# Patient Record
Sex: MALE | Race: AMERICAN INDIAN OR ALASKA NATIVE
[De-identification: names, ages, dates, MRNs, and addresses within clinical notes are randomized per-mention and may not be internally consistent; named-entity substitution may affect disease eponyms.]

---

## 2018-05-14 ENCOUNTER — HOSPITAL ENCOUNTER (EMERGENCY)
Dept: HOSPITAL 5 - ED | Age: 48
Discharge: HOME | End: 2018-05-14
Payer: SELF-PAY

## 2018-05-14 VITALS — SYSTOLIC BLOOD PRESSURE: 128 MMHG | DIASTOLIC BLOOD PRESSURE: 85 MMHG

## 2018-05-14 DIAGNOSIS — S99.921A: Primary | ICD-10-CM
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
## 2018-05-14 DIAGNOSIS — F17.200: ICD-10-CM
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
## 2018-05-14 DIAGNOSIS — J45.909: ICD-10-CM

## 2018-05-14 PROCEDURE — 99283 EMERGENCY DEPT VISIT LOW MDM: CPT

## 2018-05-14 NOTE — EMERGENCY DEPARTMENT REPORT
ED Lower Extremity HPI





- General


Chief Complaint: Extremity Injury, Lower


Stated Complaint: LEFT FOOT PAIN


Time Seen by Provider: 05/14/18 07:49


Source: patient, family


Mode of arrival: Ambulatory


Limitations: No Limitations, Physical Limitation





- History of Present Illness


Initial Comments: 





Patient reports that he heard a pop.  Found after [place] is right foot and 

heel.  He said he was jumping in and jumped up and landed on his right foot.  

Reports pain 9 out of 10 with weightbearing, walking and an 6 at [facility] 

[place].  Denies any numbness or tingling.  Reports some swelling to the lateral 

foot.  No medication taken per patient.


MD Complaint: foot injury (right foot)


-: Last night


Injury: Foot: Right (pain and swelling)


Type of Injury: inversion


Place: street/outdoors


Severity: moderate


Severity scale (0 -10): 6


Improves With: nothing


Worsens With: weight bearing, movement, palpation


Context: jumping


Associated Symptoms: snap/pop sensation, swelling, ambulatory.  denies: numbness

, tingling


Treatments Prior to Arrival: other (none)





- Related Data


 Previous Rx's











 Medication  Instructions  Recorded  Last Taken  Type


 


Ibuprofen [Motrin] 800 mg PO Q8HR PRN #12 tablet 05/14/18 Unknown Rx











 Allergies











Allergy/AdvReac Type Severity Reaction Status Date / Time


 


No Known Allergies Allergy   Unverified 05/14/18 07:44














ED Review of Systems


ROS: 


Stated complaint: LEFT FOOT PAIN


Other details as noted in HPI





Constitutional: denies: chills, fever


Respiratory: denies: cough, shortness of breath, wheezing


Cardiovascular: denies: chest pain, palpitations, edema, syncope


Gastrointestinal: denies: abdominal pain, nausea, vomiting, diarrhea, 

hematemesis, hematochezia


Musculoskeletal: joint swelling, arthralgia.  denies: back pain, myalgia


Skin: denies: rash, lesions


Neurological: denies: headache, weakness, numbness, paresthesias, confusion, 

abnormal gait, vertigo





ED Past Medical Hx





- Past Medical History


Previous Medical History?: Yes


Hx Asthma: Yes





- Surgical History


Past Surgical History?: Yes


Additional Surgical History: WRIST SURGERY





- Family History


Family history: hypertension





- Social History


Smoking Status: Current Every Day Smoker


Substance Use Type: None


Other Social History: 





Single male, works





- Medications


Home Medications: 


 Home Medications











 Medication  Instructions  Recorded  Confirmed  Last Taken  Type


 


Ibuprofen [Motrin] 800 mg PO Q8HR PRN #12 tablet 05/14/18  Unknown Rx














ED Physical Exam





- General


Limitations: No Limitations


General appearance: alert, in no apparent distress





- Head


Head exam: Present: atraumatic, normocephalic, normal inspection, other (normal 

exam)





- Eye


Eye exam: Present: normal appearance, PERRL, EOMI.  Absent: nystagmus, 

periorbital swelling, periorbital tenderness


Pupils: Present: normal accommodation





- ENT


ENT exam: Present: normal exam, normal orophraynx, mucous membranes moist





- Neck


Neck exam: Present: normal inspection, full ROM, other (no C-spine tenderness).

  Absent: tenderness, lymphadenopathy





- Respiratory


Respiratory exam: Present: normal lung sounds bilaterally.  Absent: respiratory 

distress, chest wall tenderness, accessory muscle use





- Cardiovascular


Cardiovascular Exam: Present: regular rate, normal rhythm, normal heart sounds.

  Absent: systolic murmur, diastolic murmur





- GI/Abdominal


GI/Abdominal exam: Present: soft, normal bowel sounds.  Absent: distended, 

tenderness, guarding, rebound, rigid, organomegaly, mass, bruit, pulsatile mass

, hernia





- Extremities Exam


Extremities exam: Present: normal inspection, full ROM, tenderness (tender to 

palpate to right outer foot laterally, without any calcaneal tenderness), 

normal capillary refill, pedal edema, other (no clubbing, cyanosis or edema.  +

2 pulses all extremities and no neurovascular compromise.  No abrasion, joint 

deformity, laceration or contusion.  No joint crepitus or abnormality.).  Absent

: joint swelling, calf tenderness





- Expanded Lower Extremity Exam


  ** Right


Hip exam: Present: normal inspection, full ROM, pelvic stability.  Absent: 

tenderness, swelling, abrasion, laceration, ecchymosis, deformity, crepidus, 

dislocation, erythema, external rotation, internal rotation, shortening


Upper Leg exam: Present: normal inspection, full ROM.  Absent: tenderness, 

swelling, abrasion, laceration, ecchymosis, deformity, crepidus, dislocation, 

erythema


Knee exam: Present: normal inspection, full ROM, full knee extension.  Absent: 

tenderness, swelling, abrasion, laceration, ecchymosis, deformity, crepidus, 

dislocation, erythema, effusion, pain w/ pronation/supination, posterior draw 

sign, pain/laxity with valgus, pain/laxity with varus


Lower Leg exam: Present: normal inspection, full ROM.  Absent: tenderness, 

swelling, abrasion, laceration, ecchymosis, deformity, crepidus, dislocation, 

erythema, palpable cord, Baljeet's sign


Ankle exam: Present: normal inspection, full ROM.  Absent: tenderness, swelling

, abrasion, laceration, ecchymosis, deformity, crepidus, dislocation, erythema


Foot/Toe exam: Present: normal inspection, full ROM (reports pain with 

dorsiflexion and plantar flexion.), tenderness (tender the palpate to right 

outer posterior foot laterally).  Absent: swelling, abrasion, laceration, 

ecchymosis, deformity, crepidus, dislocation, erythema, puncture wound, foreign 

body, calcaneal tenderness, tenderness at base of 5th metatarsal, nail avulsion

, subungual hematoma


Neuro vascular tendon exam: Present: no vascular compromise.  Absent: pulse 

deficit, abnormal cap refill, motor deficit, sensory deficit, tendon deficit, 

extremity cold to touch, pallor, abnormal 2-point discrimination, decreased fine

/light touch, foot drop, peroneal nerve deficit, significant pain with passive 

ROM of distal joint


Gait: Positive: observed and limited by pain





- Back Exam


Back exam: Present: normal inspection, full ROM, other (ambulates without any 

difficulties).  Absent: tenderness, CVA tenderness (R), CVA tenderness (L), 

muscle spasm, paraspinal tenderness, vertebral tenderness, rash noted





- Neurological Exam


Neurological exam: Present: alert, oriented X3, normal gait, reflexes normal.  

Absent: motor sensory deficit





- Psychiatric


Psychiatric exam: Present: normal affect, normal mood





- Skin


Skin exam: Present: warm, dry, intact, normal color.  Absent: rash





ED Course


 Vital Signs











  05/14/18 05/14/18 05/14/18





  07:44 08:18 10:18


 


Temperature 98.5 F  


 


Pulse Rate 18 L  68


 


Respiratory 18 16 





Rate   


 


Blood Pressure 128/85  


 


O2 Sat by Pulse 99  





Oximetry   














- Reevaluation(s)


Reevaluation #1: 





05/14/18 10:22


 given Norco 5/325 2 tablets.  Emergency room for right foot pain which 

relieved this pain.  X-ray negative findings





ED Lower Extremity MDM





- Radiology Data


Radiology results: report reviewed





X-ray of right foot reveals no acute fracture dislocation.





- Medical Decision Making





ED course: Patient here after injuring his right foot yesterday.  Physical 

findings for tenderness to right lateral foot and x-ray findings for normal 

exam of right foot.  Patient was given Norco 5/325 mg 2 tablets po in   I 

discussed diagnosis and treatment plan along with x-ray results and he voiced 

understanding.  Rice therapy explained.  Patient discharged home with his 

friend in stable condition with prescription for Motrin.  He was advised to 

follow-up with orthopedic doctor if he still continues to have pain. He was  

given discharge instructions with information for Dr. Levin follow-up





- Differential Diagnosis


foot fracture, foot contusion, foot sprain, arthralgia


Critical care attestation.: 


If time is entered above; I have spent that time in minutes in the direct care 

of this critically ill patient, excluding procedure time.








ED Disposition


Clinical Impression: 


 Foot pain, right





Right foot injury


Qualifiers:


 Encounter type: initial encounter Qualified Code(s): S99.921A - Unspecified 

injury of right foot, initial encounter





Disposition: DC-01 TO HOME OR SELFCARE


Is pt being admited?: No


Does the pt Need Aspirin: No


Condition: Stable


Instructions:  Arthralgia (ED), RICE Therapy (ED)


Additional Instructions: 


Please follow up with orthopedic doctor as instructed if you're foot continues 

to hurt after 72 hours for rice therapy.


Take  Motrin as prescribed for pain


See Discharge instructions on rice therapy


Prescriptions: 


Ibuprofen [Motrin] 800 mg PO Q8HR PRN #12 tablet


 PRN Reason: foot pain


Referrals: 


PRIMARY CARE,MD [Primary Care Provider] - 2-3 Days


[facility] [Outside] - 2-3 Days


ANIBAL LEVIN MD [Staff Physician] - 2-3 Days


Forms:  Work/School Release Form(ED)

## 2018-05-14 NOTE — XRAY REPORT
RIGHT FOOT, 3 views:



History: Right foot injury, pain, swelling



The bony architecture is intact.  Bony alignment is normal.

No soft tissue abnormalities are seen.  The joint spaces appear

preserved.



IMPRESSION:

Normal right foot.